# Patient Record
Sex: FEMALE | Race: BLACK OR AFRICAN AMERICAN | NOT HISPANIC OR LATINO | URBAN - METROPOLITAN AREA
[De-identification: names, ages, dates, MRNs, and addresses within clinical notes are randomized per-mention and may not be internally consistent; named-entity substitution may affect disease eponyms.]

---

## 2022-01-23 ENCOUNTER — EMERGENCY (EMERGENCY)
Facility: HOSPITAL | Age: 42
LOS: 1 days | Discharge: HOME | End: 2022-01-23
Admitting: EMERGENCY MEDICINE
Payer: COMMERCIAL

## 2022-01-23 VITALS
SYSTOLIC BLOOD PRESSURE: 140 MMHG | TEMPERATURE: 98 F | RESPIRATION RATE: 16 BRPM | DIASTOLIC BLOOD PRESSURE: 97 MMHG | OXYGEN SATURATION: 99 % | HEART RATE: 76 BPM

## 2022-01-23 VITALS
RESPIRATION RATE: 16 BRPM | HEIGHT: 66 IN | HEART RATE: 84 BPM | SYSTOLIC BLOOD PRESSURE: 132 MMHG | WEIGHT: 158.07 LBS | OXYGEN SATURATION: 100 % | DIASTOLIC BLOOD PRESSURE: 90 MMHG | TEMPERATURE: 98 F

## 2022-01-23 DIAGNOSIS — M25.561 PAIN IN RIGHT KNEE: ICD-10-CM

## 2022-01-23 DIAGNOSIS — R22.43 LOCALIZED SWELLING, MASS AND LUMP, LOWER LIMB, BILATERAL: ICD-10-CM

## 2022-01-23 DIAGNOSIS — I10 ESSENTIAL (PRIMARY) HYPERTENSION: ICD-10-CM

## 2022-01-23 LAB — D DIMER BLD IA.RAPID-MCNC: <187 NG/ML DDU — SIGNIFICANT CHANGE UP

## 2022-01-23 PROCEDURE — 99283 EMERGENCY DEPT VISIT LOW MDM: CPT | Mod: 25

## 2022-01-23 PROCEDURE — 73562 X-RAY EXAM OF KNEE 3: CPT | Mod: 26,RT

## 2022-01-23 PROCEDURE — 99284 EMERGENCY DEPT VISIT MOD MDM: CPT | Mod: 25

## 2022-01-23 PROCEDURE — 73562 X-RAY EXAM OF KNEE 3: CPT

## 2022-01-23 PROCEDURE — 85379 FIBRIN DEGRADATION QUANT: CPT

## 2022-01-23 PROCEDURE — 36415 COLL VENOUS BLD VENIPUNCTURE: CPT

## 2022-01-23 NOTE — ED PROVIDER NOTE - PATIENT PORTAL LINK FT
You can access the FollowMyHealth Patient Portal offered by  by registering at the following website: [email or web address]. By joining [employer]’s FollowMyHealth portal, you will also be able to view your health information using other applications (apps) compatible with our system.

## 2022-01-23 NOTE — ED ADULT NURSE NOTE - OBJECTIVE STATEMENT
41y female presents w/ knee pain. Pt went to [facility] earlier, and was told to come to ED for r/o DVT.

## 2022-01-23 NOTE — ED PROVIDER NOTE - OBJECTIVE STATEMENT
40 y/o F with Hx of HTN on amlodipine and hormonal IUD in place, presents with right knee pain and calf swelling. Pt states on Tuesday 5 days ago she went to her primary doctor for right knee pain, swelling, and ROM issues. Her doctor gave her Naproxen and told her to wear a knee brace and he would reevaluate in 10 days. She has been wearing the knee brace 24/7 for the past 4 days. Her next appointment with him is this upcoming Thursday. Yesterday she noticed the swelling was now down to her calf and she had some calf discomfort so she went to urgent care today who sent her to the ED to r/o blood clots. Pt also takes a daily antidepressant but is unsure of the name. No recent travel. No FHx of blood clots, FHx of arthritis. She has had no imaging done of the knee.

## 2022-01-23 NOTE — ED ADULT NURSE NOTE - NSIMPLEMENTINTERV_GEN_ALL_ED
Implemented All Universal Safety Interventions:  [place] to call system. Call bell, personal items and telephone within reach. Instruct patient to call for assistance. Room bathroom lighting operational. Non-slip footwear when patient is off stretcher. Physically safe environment: no spills, clutter or unnecessary equipment. Stretcher in lowest position, wheels locked, appropriate side rails in place.

## 2022-01-23 NOTE — ED PROVIDER NOTE - MUSCULOSKELETAL, MLM
+Right knee mild joint effusion. Right knee FROM, no overlying skin cellulitis. Ligaments intact. +Lower leg has mild swelling. No edema, no calf tenderness.

## 2022-01-23 NOTE — ED PROVIDER NOTE - CLINICAL SUMMARY MEDICAL DECISION MAKING FREE TEXT BOX
Right knee pain and leg swelling possible secondary to tight knee brace 24/7 x4 days.  Will get US to r/o DVT despite negative risk factors and x-ray to r/o arthritis in the knee. Right knee pain and leg swelling possible secondary to tight knee brace 24/7 x4 days.  Will get US to r/o DVT despite negative risk factors and x-ray to r/o arthritis in the knee.  unable to obtain US tonight - no tech. D-Dimer negative. low risk for DVT based on HPI & PE  Pt advised to follow up with PMD & if develop pain in lower leg, return to the ED for further evaluation.

## 2025-05-21 NOTE — ED ADULT NURSE NOTE - GLASGOW COMA SCALE
CLINICAL STAFF DOCUMENTATION    Dr. Jocelyn Graham  1972  300    Have you had any Chest Pain recently? - Yes  If Yes DO EKG   When did the pain begin? - Day's   What type of pain is it? - Heaviness   Tender to palpate (touch)? No  Does the pain radiate or stay in one place? - No  How long does the pain last? -  seconds    How Severe is the pain? - 3  Is there anything that aggravates or triggers the pain? Exertion  Did you take any medication?    And did it relieve the pain -   Is there anything that relieves it?  Deep breaths   Do you have any other symptoms at the same time? SOB    DO EKG IF: Patient has a Heart Rate above 100 or below 50 (Ex:A-fib, Aflutter, PAF, SVT, VT, Bradycardia)      CAD (Coronary Artery Disease) patient should have one on file every 6 months     New Consult or New Patient     If patient is following up from a current Stent/PCI     If patient is following up from a current Cardioversion        Have you had any Shortness of Breath - Yes  When did it begin? - Months   If Yes - When on exertion  How many flights of stairs can you go up without having SOB? - 1  Are you on Oxygen during the day or at night? -   How many liters of Oxygen are you on? -   How many pillows do you sleep with under your head? - 1    Have you had any dizziness - No      Have you had any palpitations recently? - Yes  If Yes DO EKG - Do you feel your heart racing pounding racing skipping     When did they begin? - Years   How long do they last - .  minutes   Is there anything that aggravates or triggers them? Exertion  Is there anything that relieves them?   Any thyroid issues? - No    Do you have any edema - swelling in No      Is the patient on any of the following medications - Multaq (Dronedarone)  If Yes DO EKG - Needs done every 3 months    When did you have your last labs drawn 4/25  What doctor ordered hoda   Do we have the labs in their chart Yes  If we do not have the labs, ask where  baseline